# Patient Record
Sex: MALE | Race: WHITE | NOT HISPANIC OR LATINO | Employment: FULL TIME | URBAN - METROPOLITAN AREA
[De-identification: names, ages, dates, MRNs, and addresses within clinical notes are randomized per-mention and may not be internally consistent; named-entity substitution may affect disease eponyms.]

---

## 2024-03-13 ENCOUNTER — OFFICE VISIT (OUTPATIENT)
Dept: NEUROLOGY | Facility: CLINIC | Age: 24
End: 2024-03-13
Payer: COMMERCIAL

## 2024-03-13 VITALS
BODY MASS INDEX: 23.1 KG/M2 | DIASTOLIC BLOOD PRESSURE: 88 MMHG | SYSTOLIC BLOOD PRESSURE: 132 MMHG | WEIGHT: 165 LBS | HEIGHT: 71 IN

## 2024-03-13 DIAGNOSIS — F41.9 ANXIETY: ICD-10-CM

## 2024-03-13 DIAGNOSIS — R20.2 PARESTHESIAS: Primary | ICD-10-CM

## 2024-03-13 DIAGNOSIS — G47.33 OSA ON CPAP: ICD-10-CM

## 2024-03-13 PROCEDURE — 99999 PR PBB SHADOW E&M-NEW PATIENT-LVL III: CPT | Mod: PBBFAC,,, | Performed by: SPECIALIST

## 2024-03-13 PROCEDURE — 3008F BODY MASS INDEX DOCD: CPT | Mod: CPTII,S$GLB,, | Performed by: SPECIALIST

## 2024-03-13 PROCEDURE — 3079F DIAST BP 80-89 MM HG: CPT | Mod: CPTII,S$GLB,, | Performed by: SPECIALIST

## 2024-03-13 PROCEDURE — 99204 OFFICE O/P NEW MOD 45 MIN: CPT | Mod: S$GLB,,, | Performed by: SPECIALIST

## 2024-03-13 PROCEDURE — 3075F SYST BP GE 130 - 139MM HG: CPT | Mod: CPTII,S$GLB,, | Performed by: SPECIALIST

## 2024-03-13 PROCEDURE — 1159F MED LIST DOCD IN RCRD: CPT | Mod: CPTII,S$GLB,, | Performed by: SPECIALIST

## 2024-03-13 RX ORDER — PROPRANOLOL HYDROCHLORIDE 10 MG/1
10 TABLET ORAL 2 TIMES DAILY
COMMUNITY
Start: 2024-02-22

## 2024-03-13 RX ORDER — AMITRIPTYLINE HYDROCHLORIDE 10 MG/1
10-20 TABLET, FILM COATED ORAL
COMMUNITY
Start: 2024-01-27

## 2024-03-13 RX ORDER — OMEPRAZOLE 40 MG/1
40 CAPSULE, DELAYED RELEASE ORAL
COMMUNITY
Start: 2024-01-27

## 2024-03-13 RX ORDER — ASPIRIN 325 MG
50000 TABLET, DELAYED RELEASE (ENTERIC COATED) ORAL
COMMUNITY
Start: 2024-03-04

## 2024-03-13 NOTE — PROGRESS NOTES
"Subjective:      @Patient ID: Hadley Medina is a 23 y.o. male.    Chief Complaint: NP self ref for neuro cons to eval for Numb.-Tingling.      HPI:              C/O Numbness-tingling B hands B feet and B legs on and off x 3-4 wks. Was told he had a panic attack he got lightheaded w tachycardia, chest pain and limbs going numb. Seen at [facility] ED x 3 and was told to see a Cardiologist and a Neurologist.      Had a normal Echo and Holter and Cardiologist said his heart was fine.   No Imaging was done.   Pt's father (Edouard) is here today.     See also 'facesheet' under media poss handwr notes     Review of Systems   MRI of head 5 y ago for HAs and was told was normal       [x] Single     []     [] []   [] Working     [] Retired, worked as:   [] Drives     [] Does not drive     ----------------------------  LILLIAN (obstructive sleep apnea)  ..  Current Outpatient Medications   Medication Instructions    amitriptyline (ELAVIL) 10-20 mg, Oral    cholecalciferol (vitamin D3) 50,000 Units, Oral, Every 7 days    omeprazole (PRILOSEC) 40 mg, Oral    propranoloL (INDERAL) 10 mg, Oral, 2 times daily      Amitrip nightly 5 yrs     Objective:      Exam:   Visit Vitals  /88   Ht 5' 11" (1.803 m)   Wt 74.8 kg (165 lb)   BMI 23.01 kg/m²     General Exam  If Accompanied, by__       body habitus_ Body mass index is 23.01 kg/m².  Gen exam     Neurological:  [x]Normal neuro exam         Or     Exam comments:        Neuroimaging:  [] Images and imaging reports reviewed. Rads summary:  My comments:     Labs:    [x]  New Patient         []  Multiple Issues/ diagnoses or problems  [if not enumerated in note then discussed but not documented]    Complexity of Data:   [] High    [x] Moderate   [] Images and reports reviewed [x] History obtained from accompaniment  [] Studies ordered [x] Studies consid or discussed, not ordered   [x] Differential Diagnoses discussed       Risks:   [] High     [x] Moderate   [] " (poss or def) neurodegenerative condition [] () autoimmune condition with possibility of flares or unexpected attack  [] () seiz d.o. with possib of recurr seiz's  [] Cerebrovasc ds with risk of recurrent stroke  [] CNS meds (and/or) potentially high risk non CNS meds taken or discussed which may cause med or behav SE's  [] Fall risk [] Driving discussed  [] Diagnosis unclear or DDx wide making risk uncertain   []:    MDM:    [] High     [x] Moderate       Assessment/Plan:         ICD-10-CM ICD-9-CM   1. Paresthesias  R20.2 782.0   2. Anxiety  F41.9 300.00   3. LILLIAN on CPAP  G47.33 327.23         Other Comments / Follow Up:      Hopeful reassurance     Brain MRI discussed not ordered   EEG discussed not ordered   Dysautonomia discussed    try to stay hydrated       Avtar Mckinnon MD RIDDHI FAAN, [facility]  [facility] Medical Director   [facility]